# Patient Record
Sex: FEMALE | Race: WHITE | NOT HISPANIC OR LATINO | Employment: STUDENT | URBAN - METROPOLITAN AREA
[De-identification: names, ages, dates, MRNs, and addresses within clinical notes are randomized per-mention and may not be internally consistent; named-entity substitution may affect disease eponyms.]

---
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
## 2025-02-21 ENCOUNTER — OFFICE VISIT (OUTPATIENT)
Dept: PSYCHIATRY | Facility: CLINIC | Age: 20
End: 2025-02-21
Payer: MEDICAID

## 2025-02-21 VITALS
OXYGEN SATURATION: 98 % | HEART RATE: 69 BPM | SYSTOLIC BLOOD PRESSURE: 105 MMHG | BODY MASS INDEX: 38 KG/M2 | DIASTOLIC BLOOD PRESSURE: 75 MMHG | WEIGHT: 221.4 LBS

## 2025-02-21 DIAGNOSIS — F43.10 POST TRAUMATIC STRESS DISORDER (PTSD): Primary | ICD-10-CM

## 2025-02-21 DIAGNOSIS — F33.1 MDD (MAJOR DEPRESSIVE DISORDER), RECURRENT EPISODE, MODERATE: ICD-10-CM

## 2025-02-21 RX ORDER — LITHIUM CARBONATE 300 MG
300 TABLET ORAL NIGHTLY
Qty: 30 TABLET | Refills: 2 | Status: SHIPPED | OUTPATIENT
Start: 2025-02-21 | End: 2026-02-21
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
## 2025-02-27 NOTE — PATIENT INSTRUCTIONS
Exercises for Chronic Knee Pain  Chronic knee pain is pain that lasts longer than 3 months. For most people with chronic knee pain, exercise and weight loss is an important part of treatment. Your health care provider may want you to focus on:  · Strengthening the muscles that support your knee. This can take pressure off your knee and lessen pain.  · Preventing knee stiffness.  · Maintaining or increasing how far you can move your knee.  · Losing weight (if this applies) to take pressure off your knee, decrease your risk for injury, and make it easier for you to exercise.  Your health care provider will help you develop an exercise program that matches your needs and physical abilities. Below are simple, low-impact exercises you can do at home. Ask your health care provider or a physical therapist how often you should do your exercise program and how many times to repeat each exercise.  General safety tips  Follow these safety tips for exercising with chronic knee pain:  · Get your health care provider's approval before doing any exercises.  · Start slowly and stop any time an exercise causes pain.  · Do not exercise if your knee pain is flaring up.  · Warm up first. Stretching a cold muscle can cause an injury. Do 5-10 minutes of easy movement or light stretching before beginning your exercise routine.  · Do 5-10 minutes of low-impact activity (like walking or cycling) before starting strengthening exercises.  · Contact your health care provider any time you have pain during or after exercising. Exercise may cause discomfort but should not be painful. It is normal to be a little stiff or sore after exercising.    Stretching and range-of-motion exercises  Front thigh stretch    1. Stand up straight and support your body by holding on to a chair or resting one hand on a wall.  2. With your legs straight and close together, bend one knee to lift your heel up toward your buttocks.  3. Using one hand for support, grab  your ankle with your free hand.  4. Pull your foot up closer toward your buttocks to feel the stretch in front of your thigh.  5. Hold the stretch for 30 seconds.  Repeat __________ times. Complete this exercise __________ times a day.  Back thigh stretch    1. Sit on the floor with your back straight and your legs out straight in front of you.  2. Place the palms of your hands on the floor and slide them toward your feet as you bend at the hip.  3. Try to touch your nose to your knees and feel the stretch in the back of your thighs.  4. Hold for 30 seconds.  Repeat __________ times. Complete this exercise __________ times a day.  Calf stretch    1. Stand facing a wall.  2. Place the palms of your hands flat against the wall, arms extended, and lean slightly against the wall.  3. Get into a lunge position with one leg bent at the knee and the other leg stretched out straight behind you.  4. Keep both feet facing the wall and increase the bend in your knee while keeping the heel of the other leg flat on the ground.  5. You should feel the stretch in your calf. Hold for 30 seconds.  Repeat __________ times. Complete this exercise __________ times a day.  Strengthening exercises  Straight leg lift  1. Lie on your back with one knee bent and the other leg out straight.  2. Slowly lift the straight leg without bending the knee.  3. Lift until your foot is about 12 inches (30 cm) off the floor.  4. Hold for 3-5 seconds and slowly lower your leg.  Repeat __________ times. Complete this exercise __________ times a day.  Single leg dip  1. Stand between two chairs and put both hands on the backs of the chairs for support.  2. Extend one leg out straight with your body weight resting on the heel of the standing leg.  3. Slowly bend your standing knee to dip your body to the level that is comfortable for you.  4. Hold for 3-5 seconds.  Repeat __________ times. Complete this exercise __________ times a day.  Hamstring  curls  1. Stand straight, knees close together, facing the back of a chair.  2. Hold on to the back of a chair with both hands.  3. Keep one leg straight. Bend the other knee while bringing the heel up toward the buttock until the knee is bent at a 90-degree angle (right angle).  4. Hold for 3-5 seconds.  Repeat __________ times. Complete this exercise __________ times a day.  Wall squat  1. Stand straight with your back, hips, and head against a wall.  2. Step forward one foot at a time with your back still against the wall.  3. Your feet should be 2 feet (61 cm) from the wall at shoulder width. Keeping your back, hips, and head against the wall, slide down the wall to as close of a sitting position as you can get.  4. Hold for 5-10 seconds, then slowly slide back up.  Repeat __________ times. Complete this exercise __________ times a day.  Step-ups  1. Step up with one foot onto a sturdy platform or stool that is about 6 inches (15 cm) high.  2. Face sideways with one foot on the platform and one on the ground.  3. Place all your weight on the platform foot and lift your body off the ground until your knee extends.  4. Let your other leg hang free to the side.  5. Hold for 3-5 seconds then slowly lower your weight down to the floor foot.  Repeat __________ times. Complete this exercise __________ times a day.  Contact a health care provider if:  · Your exercise causes pain.  · Your pain is worse after you exercise.  · Your pain prevents you from doing your exercises.  This information is not intended to replace advice given to you by your health care provider. Make sure you discuss any questions you have with your health care provider.  Document Revised: 04/22/2021 Document Reviewed: 12/14/2020  [employer] Patient Education © 2021 [employer]     no

## 2025-03-31 ENCOUNTER — OFFICE VISIT (OUTPATIENT)
Dept: PSYCHIATRY | Facility: CLINIC | Age: 20
End: 2025-03-31
Payer: MEDICAID

## 2025-03-31 DIAGNOSIS — F33.1 MDD (MAJOR DEPRESSIVE DISORDER), RECURRENT EPISODE, MODERATE: Primary | ICD-10-CM

## 2025-03-31 DIAGNOSIS — F43.10 POST TRAUMATIC STRESS DISORDER (PTSD): ICD-10-CM
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
## 2025-05-20 ENCOUNTER — OFFICE VISIT (OUTPATIENT)
Dept: FAMILY MEDICINE CLINIC | Facility: CLINIC | Age: 20
End: 2025-05-20
Payer: COMMERCIAL

## 2025-05-20 VITALS
RESPIRATION RATE: 18 BRPM | BODY MASS INDEX: 38.31 KG/M2 | HEIGHT: 64 IN | TEMPERATURE: 98.7 F | SYSTOLIC BLOOD PRESSURE: 122 MMHG | OXYGEN SATURATION: 99 % | HEART RATE: 77 BPM | DIASTOLIC BLOOD PRESSURE: 84 MMHG | WEIGHT: 224.4 LBS

## 2025-05-20 DIAGNOSIS — E66.9 OBESITY (BMI 30-39.9): ICD-10-CM

## 2025-05-20 DIAGNOSIS — R19.00 RIGHT FLANK MASS: ICD-10-CM

## 2025-05-20 DIAGNOSIS — R10.9 ABDOMINAL PAIN, UNSPECIFIED ABDOMINAL LOCATION: ICD-10-CM

## 2025-05-20 DIAGNOSIS — R06.83 SNORING: Primary | ICD-10-CM

## 2025-05-20 DIAGNOSIS — M25.50 PAIN IN JOINT, MULTIPLE SITES: ICD-10-CM

## 2025-05-20 DIAGNOSIS — E55.9 VITAMIN D DEFICIENCY: ICD-10-CM

## 2025-05-20 DIAGNOSIS — R53.83 FATIGUE, UNSPECIFIED TYPE: ICD-10-CM

## 2025-05-20 DIAGNOSIS — R19.5 LOOSE STOOLS: ICD-10-CM

## 2025-05-20 NOTE — PROGRESS NOTES
Subjective     History of Present Illness  The patient presents for evaluation of a mass on the side of her body, joint pain, gastrointestinal issues, sleep disturbances, and weight management.    She reports a palpable mass on the lateral aspect of her body, which becomes noticeable upon stretching or movement. The mass is tender to touch and has not exhibited any changes in size. She regularly stretches and feels on her body to ensure she is okay, often rubbing it due to constant soreness and back pain.    She experiences generalized fatigue and joint pain, predominantly in her knees and ankles, with occasional wrist pain and hand cramps. The pain is not symmetrical. She has been diagnosed with scoliosis and arthritis of the spine and manages her joint and back pain with Advil and Tylenol.    Recently, she has been experiencing sharp, stabbing abdominal pains, a departure from her usual dull, achy discomfort. Her bowel movements have become infrequent and less substantial, occurring every other day or multiple times within a day. She has not had solid stools for approximately a month. Despite no changes in her diet, she has been eating less frequently due to a lack of appetite and stomach pain. She has consulted a gastroenterologist in the past, who performed an esophageal dilation and endoscopy, revealing only minor bowel irritation. This procedure was conducted when she was 17 years old. She has not undergone any food allergy or stool tests. She does not experience any adverse reactions to gluten but notes that spicy foods exacerbate her stomach issues. She has reduced her intake of spicy foods. She was advised to cease cannabis use, which she has since reduced from daily to occasional use. She expresses concern about her worsening condition and recurrent symptoms. She has undergone blood work in the past, which did not reveal any abnormalities. Previous attempts at medication management were unsuccessful,  leading to discontinuation.    She reports poor sleep quality, characterized by frequent awakenings and occasional morning headaches. She snores but has not undergone a sleep study. A sleep study was suggested during her last visit, but she did not receive any follow-up information.    She has been attempting weight loss but continues to gain weight despite improved dietary choices and increased physical activity. Her weight has remained stable at 220 pounds for the past year. She works on her feet all day and takes 10,000 steps a day. She was previously on Wellbutrin for mental health issues, which aided in weight loss, but she regained the weight after discontinuing the medication at age 18.    She has been diagnosed with PCOS. Her menstrual cycles are regular, occurring monthly, with occasional delays of 4 to 5 days. She is currently menstruating and used to experience severe cramps, which have since improved. She also reports heat intolerance, which previously triggered panic attacks, but now only causes agitation and dizziness.    GYNECOLOGICAL HISTORY:  - Frequency and Flow: Regular, monthly, with occasional delays of 4 to 5 days  - Menstrual Pain: Improved    PAST SURGICAL HISTORY:  - Esophageal dilation and endoscopy at age 17    SOCIAL HISTORY  The patient admits to smoking weed occasionally but has cut back significantly from smoking multiple times a day.    FAMILY HISTORY  Her uncle has Crohn's disease.  There is no family history of autoimmune diseases or thyroid issues that she is aware of.    Past Medical History:   Diagnosis Date    Allergic     Anxiety     Generalized anxiety d/o    Arthritis     Depression     Miscarriage 08/2022    PCOS (polycystic ovarian syndrome)     Scoliosis      Past Surgical History:   Procedure Laterality Date    DILATATION AND CURETTAGE      HAND SURGERY Right     WISDOM TOOTH EXTRACTION  10/2020     Family History   Problem Relation Age of Onset    Alcohol abuse Mother      Anxiety disorder Mother     Depression Mother     Vision loss Mother     Anxiety disorder Father     Depression Father     Diabetes Father     Drug abuse Father     Heart disease Father     Hypertension Father     Vision loss Father     Anxiety disorder Sister     Depression Sister     Vision loss Sister     Alcohol abuse Sister     Anxiety disorder Brother     Depression Brother     Alcohol abuse Maternal Aunt     Vision loss Maternal Aunt     Vision loss Maternal Uncle     Alcohol abuse Paternal Uncle     Alcohol abuse Maternal Grandmother     Arthritis Maternal Grandmother     Vision loss Maternal Grandmother     Vision loss Maternal Grandfather     Vision loss Paternal Grandmother     Vision loss Paternal Grandfather      Social History     Socioeconomic History    Marital status: Significant Other    Number of children: 0    Highest education level: Some college, no degree   Tobacco Use    Smoking status: Every Day     Types: Electronic Cigarette     Passive exposure: Current    Smokeless tobacco: Never   Vaping Use    Vaping status: Every Day    Substances: Nicotine, Flavoring    Devices: Disposable   Substance and Sexual Activity    Alcohol use: Not Currently     Comment: ocasionally    Drug use: Yes     Types: Marijuana    Sexual activity: Yes     Partners: Male     Birth control/protection: None       No current outpatient medications on file.    Review of Systems   Constitutional:  Positive for fatigue. Negative for activity change, appetite change, chills, diaphoresis, fever, unexpected weight gain and unexpected weight loss.   HENT:  Negative for trouble swallowing.         Snoring   Eyes:  Negative for blurred vision and visual disturbance.   Respiratory:  Negative for cough, chest tightness, shortness of breath and wheezing.    Cardiovascular:  Negative for chest pain, palpitations and leg swelling.   Gastrointestinal:  Positive for abdominal pain. Negative for nausea, vomiting, GERD and  "indigestion.        Loose bowel movements   Genitourinary:  Negative for menstrual problem.   Musculoskeletal:  Positive for back pain. Negative for gait problem.   Neurological:  Negative for dizziness, tremors, syncope, weakness, light-headedness, headache and confusion.   Psychiatric/Behavioral:  Positive for sleep disturbance. Negative for depressed mood and stress. The patient is not nervous/anxious.      /84 (BP Location: Left arm, Patient Position: Sitting, Cuff Size: Adult)   Pulse 77   Temp 98.7 °F (37.1 °C) (Temporal)   Resp 18   Ht 162.6 cm (64.02\")   Wt 102 kg (224 lb 6.4 oz)   SpO2 99%   BMI 38.50 kg/m²       Objective   Physical Exam  Vitals and nursing note reviewed.   Constitutional:       General: She is not in acute distress.     Appearance: Normal appearance. She is normal weight.   HENT:      Head: Normocephalic and atraumatic.   Neck:      Thyroid: No thyroid mass, thyromegaly or thyroid tenderness.   Cardiovascular:      Rate and Rhythm: Normal rate and regular rhythm.      Heart sounds: Normal heart sounds. No murmur heard.  Pulmonary:      Effort: Pulmonary effort is normal. No respiratory distress.      Breath sounds: Normal breath sounds. No wheezing, rhonchi or rales.   Abdominal:      General: Abdomen is flat. Bowel sounds are normal. There is no distension.      Palpations: Abdomen is soft. There is no mass.      Tenderness: There is abdominal tenderness in the right upper quadrant, suprapubic area and left upper quadrant.   Musculoskeletal:      Cervical back: Normal range of motion and neck supple.        Back:       Right lower leg: No edema.      Left lower leg: No edema.   Skin:     General: Skin is warm and dry.   Neurological:      General: No focal deficit present.      Mental Status: She is alert and oriented to person, place, and time.   Psychiatric:         Mood and Affect: Mood normal.         Behavior: Behavior normal.         Thought Content: Thought content " normal.         Physical Exam  Gastrointestinal: Tenderness noted in the abdomen, particularly on the left side.  Other: Palpable mass on the left side of the body, tender upon movement.    Procedures     Results      Assessment    Diagnoses and all orders for this visit:    1. Snoring (Primary)  Comments:  sleep study referral entered.  Orders:  -     Ambulatory Referral to Sleep Medicine    2. Obesity (BMI 30-39.9)  Comments:  Will check labs.  Orders:  -     Ambulatory Referral to Sleep Medicine  -     Insulin, Total; Future  -     Hemoglobin A1c; Future  -     T3, free; Future  -     T4, free; Future  -     Thyroid Antibodies; Future  -     Cortisol - AM; Future    3. Pain in joint, multiple sites  Comments:  Will check labs.  Orders:  -     ABDIFATAH Comprehensive Panel; Future  -     Rheumatoid Factor, Quant; Future  -     Sedimentation rate, automated; Future  -     C-reactive protein; Future    4. Abdominal pain, unspecified abdominal location  Comments:  chronic issues.  Will check labs.  Orders:  -     CBC w AUTO Differential; Future  -     Food Allergy Profile; Future    5. Fatigue, unspecified type  Comments:  will check labs.  Orders:  -     ABDIFATAH Comprehensive Panel; Future  -     TSH; Future  -     T3, free; Future  -     T4, free; Future  -     Thyroid Antibodies; Future  -     Cortisol - AM; Future  -     Lyme Disease Total Antibody With Reflex to Immunoassay; Future    6. Vitamin D deficiency  Comments:  Will check labs.  Orders:  -     Vitamin D 25 hydroxy; Future    7. Loose stools  Comments:  Will check labs and stool studies.  Has had colonoscopy, upper endoscopy, CT scan in past which didn't show anything concerning.  Consider HIDA scan.  Orders:  -     Celiac Comprehensive Panel; Future  -     Inflammatory Bowel Disease Panel; Future  -     Gastrointestinal Panel, PCR - Stool, Per Rectum; Future    8. Right flank mass  Comments:  will check soft tissue ultrasound and urine.  Orders:  -     US Soft  Tissue; Future  -     Urinalysis With Culture If Indicated - Urine, Clean Catch; Future         Assessment & Plan  1. Unspecified mass.  - Patient reports a mass on the side of the body, tender upon movement.  - Physical examination reveals tenderness in the area of the mass.  - Ultrasound of the mass will be scheduled to determine its nature.  - Patient advised to monitor for any changes in the mass.    2. Joint pain.  - Patient experiences joint pain in ankles, wrists, fingers, and knees, with varying intensity.  - No symmetrical pattern observed in joint pain.  - Fasting blood work will be ordered to assess insulin and cortisol levels.  - Patient advised to continue using Advil and Tylenol for pain management.    3. Gastrointestinal issues.  - Patient reports sharp, stabbing abdominal pains and infrequent, non-solid bowel movements for the past 2-3 months.  - Physical examination reveals tenderness in the abdominal area.  - Stool test will be conducted to further investigate the cause of the symptoms.  - Patient advised to consider dietary modifications and monitor for any food sensitivities.    4. Sleep disturbances.  - Patient reports frequent awakenings during the night and poor sleep quality.  - Referral for a sleep study will be made to evaluate the sleep disturbances.  - Patient advised to follow up if no contact is made regarding the sleep study within a couple of weeks.              Patient or patient representative verbalized consent for the use of Ambient Listening during the visit with  ALEXANDER Mcdermott for chart documentation. 5/20/2025  16:46 EDT

## 2025-05-29 ENCOUNTER — LAB (OUTPATIENT)
Dept: LAB | Facility: HOSPITAL | Age: 20
End: 2025-05-29
Payer: COMMERCIAL

## 2025-05-29 DIAGNOSIS — E55.9 VITAMIN D DEFICIENCY: ICD-10-CM

## 2025-05-29 DIAGNOSIS — E66.9 OBESITY (BMI 30-39.9): ICD-10-CM

## 2025-05-29 DIAGNOSIS — R19.5 LOOSE STOOLS: ICD-10-CM

## 2025-05-29 DIAGNOSIS — R19.00 RIGHT FLANK MASS: ICD-10-CM

## 2025-05-29 DIAGNOSIS — R10.9 ABDOMINAL PAIN, UNSPECIFIED ABDOMINAL LOCATION: ICD-10-CM

## 2025-05-29 DIAGNOSIS — R53.83 FATIGUE, UNSPECIFIED TYPE: ICD-10-CM

## 2025-05-29 DIAGNOSIS — M25.50 PAIN IN JOINT, MULTIPLE SITES: ICD-10-CM

## 2025-05-29 LAB
25(OH)D3 SERPL-MCNC: 31.8 NG/ML (ref 30–100)
BASOPHILS # BLD AUTO: 0.04 10*3/MM3 (ref 0–0.2)
BASOPHILS NFR BLD AUTO: 0.6 % (ref 0–1.5)
BILIRUB UR QL STRIP: NEGATIVE
CHROMATIN AB SERPL-ACNC: <10 IU/ML (ref 0–14)
CLARITY UR: ABNORMAL
COLOR UR: YELLOW
CORTIS SERPL-MCNC: 7.28 MCG/DL
CRP SERPL-MCNC: <0.3 MG/DL (ref 0–0.5)
DEPRECATED RDW RBC AUTO: 39.2 FL (ref 37–54)
EOSINOPHIL # BLD AUTO: 0.3 10*3/MM3 (ref 0–0.4)
EOSINOPHIL NFR BLD AUTO: 4.5 % (ref 0.3–6.2)
ERYTHROCYTE [DISTWIDTH] IN BLOOD BY AUTOMATED COUNT: 11.8 % (ref 12.3–15.4)
ERYTHROCYTE [SEDIMENTATION RATE] IN BLOOD: 2 MM/HR (ref 0–20)
GLUCOSE UR STRIP-MCNC: NEGATIVE MG/DL
HBA1C MFR BLD: 4.9 % (ref 4.8–5.6)
HCT VFR BLD AUTO: 43.2 % (ref 34–46.6)
HGB BLD-MCNC: 14.4 G/DL (ref 12–15.9)
HGB UR QL STRIP.AUTO: NEGATIVE
HOLD SPECIMEN: NORMAL
IMM GRANULOCYTES # BLD AUTO: 0.02 10*3/MM3 (ref 0–0.05)
IMM GRANULOCYTES NFR BLD AUTO: 0.3 % (ref 0–0.5)
INSULIN SERPL-ACNC: 19.2 UU/ML (ref 2–23)
KETONES UR QL STRIP: NEGATIVE
LEUKOCYTE ESTERASE UR QL STRIP.AUTO: NEGATIVE
LYMPHOCYTES # BLD AUTO: 1.63 10*3/MM3 (ref 0.7–3.1)
LYMPHOCYTES NFR BLD AUTO: 24.5 % (ref 19.6–45.3)
MCH RBC QN AUTO: 30 PG (ref 26.6–33)
MCHC RBC AUTO-ENTMCNC: 33.3 G/DL (ref 31.5–35.7)
MCV RBC AUTO: 90 FL (ref 79–97)
MONOCYTES # BLD AUTO: 0.42 10*3/MM3 (ref 0.1–0.9)
MONOCYTES NFR BLD AUTO: 6.3 % (ref 5–12)
NEUTROPHILS NFR BLD AUTO: 4.24 10*3/MM3 (ref 1.7–7)
NEUTROPHILS NFR BLD AUTO: 63.8 % (ref 42.7–76)
NITRITE UR QL STRIP: NEGATIVE
NRBC BLD AUTO-RTO: 0 /100 WBC (ref 0–0.2)
PH UR STRIP.AUTO: 8 [PH] (ref 5–8)
PLATELET # BLD AUTO: 291 10*3/MM3 (ref 140–450)
PMV BLD AUTO: 10.8 FL (ref 6–12)
PROT UR QL STRIP: NEGATIVE
RBC # BLD AUTO: 4.8 10*6/MM3 (ref 3.77–5.28)
SP GR UR STRIP: 1.02 (ref 1–1.03)
T3FREE SERPL-MCNC: 3.22 PG/ML (ref 2–4.4)
T4 FREE SERPL-MCNC: 1.22 NG/DL (ref 0.92–1.68)
TSH SERPL DL<=0.05 MIU/L-ACNC: 1.43 UIU/ML (ref 0.27–4.2)
UROBILINOGEN UR QL STRIP: ABNORMAL
WBC NRBC COR # BLD AUTO: 6.65 10*3/MM3 (ref 3.4–10.8)

## 2025-05-29 PROCEDURE — 86376 MICROSOMAL ANTIBODY EACH: CPT

## 2025-05-29 PROCEDURE — 86225 DNA ANTIBODY NATIVE: CPT

## 2025-05-29 PROCEDURE — 86003 ALLG SPEC IGE CRUDE XTRC EA: CPT

## 2025-05-29 PROCEDURE — 86235 NUCLEAR ANTIGEN ANTIBODY: CPT

## 2025-05-29 PROCEDURE — 86231 EMA EACH IG CLASS: CPT

## 2025-05-29 PROCEDURE — 82784 ASSAY IGA/IGD/IGG/IGM EACH: CPT

## 2025-05-29 PROCEDURE — 86140 C-REACTIVE PROTEIN: CPT

## 2025-05-29 PROCEDURE — 86258 DGP ANTIBODY EACH IG CLASS: CPT

## 2025-05-29 PROCEDURE — 86618 LYME DISEASE ANTIBODY: CPT

## 2025-05-29 PROCEDURE — 86431 RHEUMATOID FACTOR QUANT: CPT

## 2025-05-29 PROCEDURE — 85025 COMPLETE CBC W/AUTO DIFF WBC: CPT

## 2025-05-29 PROCEDURE — 86671 FUNGUS NES ANTIBODY: CPT

## 2025-05-29 PROCEDURE — 85652 RBC SED RATE AUTOMATED: CPT

## 2025-05-29 PROCEDURE — 82306 VITAMIN D 25 HYDROXY: CPT

## 2025-05-29 PROCEDURE — 82533 TOTAL CORTISOL: CPT

## 2025-05-29 PROCEDURE — 36415 COLL VENOUS BLD VENIPUNCTURE: CPT

## 2025-05-29 PROCEDURE — 86364 TISS TRNSGLTMNASE EA IG CLAS: CPT

## 2025-05-29 PROCEDURE — 86800 THYROGLOBULIN ANTIBODY: CPT

## 2025-05-29 PROCEDURE — 86037 ANCA TITER EACH ANTIBODY: CPT

## 2025-05-29 PROCEDURE — 84481 FREE ASSAY (FT-3): CPT

## 2025-05-29 PROCEDURE — 83525 ASSAY OF INSULIN: CPT

## 2025-05-29 PROCEDURE — 84439 ASSAY OF FREE THYROXINE: CPT

## 2025-05-29 PROCEDURE — 83036 HEMOGLOBIN GLYCOSYLATED A1C: CPT

## 2025-05-29 PROCEDURE — 81003 URINALYSIS AUTO W/O SCOPE: CPT

## 2025-05-29 PROCEDURE — 84443 ASSAY THYROID STIM HORMONE: CPT

## 2025-05-30 LAB
B BURGDOR IGG+IGM SER QL IA: NEGATIVE
CENTROMERE B AB SER-ACNC: <0.2 AI (ref 0–0.9)
CHROMATIN AB SERPL-ACNC: <0.2 AI (ref 0–0.9)
DSDNA AB SER-ACNC: <1 IU/ML (ref 0–9)
ENA JO1 AB SER-ACNC: <0.2 AI (ref 0–0.9)
ENA RNP AB SER-ACNC: 0.2 AI (ref 0–0.9)
ENA SCL70 AB SER-ACNC: <0.2 AI (ref 0–0.9)
ENA SM AB SER-ACNC: <0.2 AI (ref 0–0.9)
ENA SS-A AB SER-ACNC: <0.2 AI (ref 0–0.9)
ENA SS-B AB SER-ACNC: <0.2 AI (ref 0–0.9)
ENDOMYSIUM IGA SER QL: NEGATIVE
GLIADIN PEPTIDE IGA SER-ACNC: 4 UNITS (ref 0–19)
GLIADIN PEPTIDE IGG SER-ACNC: 2 UNITS (ref 0–19)
IGA SERPL-MCNC: 194 MG/DL (ref 87–352)
Lab: NORMAL
THYROGLOB AB SERPL-ACNC: <1 IU/ML (ref 0–0.9)
THYROPEROXIDASE AB SERPL-ACNC: 11 IU/ML (ref 0–34)
TTG IGA SER-ACNC: <2 U/ML (ref 0–3)
TTG IGG SER-ACNC: <2 U/ML (ref 0–5)

## 2025-06-02 LAB
BAKER'S YEAST IGA QN: <20 UNITS (ref 0–24.9)
BAKER'S YEAST IGG QN: <20 UNITS (ref 0–24.9)
CLAM IGE QN: <0.1 KU/L
CODFISH IGE QN: <0.1 KU/L
CONV CLASS DESCRIPTION: ABNORMAL
CORN IGE QN: 0.18 KU/L
COW MILK IGE QN: 0.22 KU/L
EGG WHITE IGE QN: <0.1 KU/L
P-ANCA ATYPICAL TITR SER IF: NORMAL TITER
PEANUT IGE QN: 1.43 KU/L
SCALLOP IGE QN: 0.45 KU/L
SESAME SEED IGE QN: 0.14 KU/L
SHRIMP IGE QN: <0.1 KU/L
SOYBEAN IGE QN: <0.1 KU/L
WALNUT IGE QN: 0.16 KU/L
WHEAT IGE QN: <0.1 KU/L

## 2025-06-04 ENCOUNTER — HOSPITAL ENCOUNTER (OUTPATIENT)
Dept: ULTRASOUND IMAGING | Facility: HOSPITAL | Age: 20
Discharge: HOME | End: 2025-06-04
Admitting: PHYSICIAN ASSISTANT
Payer: COMMERCIAL

## 2025-06-04 DIAGNOSIS — R19.00 RIGHT FLANK MASS: ICD-10-CM

## 2025-06-04 PROCEDURE — 76705 ECHO EXAM OF ABDOMEN: CPT

## 2025-07-01 ENCOUNTER — APPOINTMENT (OUTPATIENT)
Dept: GENERAL RADIOLOGY | Facility: HOSPITAL | Age: 20
End: 2025-07-01
Payer: COMMERCIAL

## 2025-07-01 ENCOUNTER — HOSPITAL ENCOUNTER (EMERGENCY)
Facility: HOSPITAL | Age: 20
Discharge: HOME | End: 2025-07-01
Admitting: EMERGENCY MEDICINE
Payer: COMMERCIAL

## 2025-07-01 VITALS
HEART RATE: 103 BPM | OXYGEN SATURATION: 97 % | DIASTOLIC BLOOD PRESSURE: 85 MMHG | WEIGHT: 215 LBS | BODY MASS INDEX: 36.7 KG/M2 | SYSTOLIC BLOOD PRESSURE: 132 MMHG | RESPIRATION RATE: 16 BRPM | HEIGHT: 64 IN | TEMPERATURE: 98.8 F

## 2025-07-01 DIAGNOSIS — M25.571 ACUTE RIGHT ANKLE PAIN: Primary | ICD-10-CM

## 2025-07-01 DIAGNOSIS — S99.911A INJURY OF RIGHT ANKLE, INITIAL ENCOUNTER: ICD-10-CM

## 2025-07-01 PROCEDURE — 73610 X-RAY EXAM OF ANKLE: CPT

## 2025-07-01 PROCEDURE — 99283 EMERGENCY DEPT VISIT LOW MDM: CPT

## 2025-07-01 RX ORDER — HYDROCODONE BITARTRATE AND ACETAMINOPHEN 5; 325 MG/1; MG/1
1 TABLET ORAL EVERY 6 HOURS PRN
Qty: 12 TABLET | Refills: 0 | Status: SHIPPED | OUTPATIENT
Start: 2025-07-01

## 2025-07-01 RX ORDER — HYDROCODONE BITARTRATE AND ACETAMINOPHEN 5; 325 MG/1; MG/1
1 TABLET ORAL ONCE AS NEEDED
Refills: 0 | Status: COMPLETED | OUTPATIENT
Start: 2025-07-01 | End: 2025-07-01

## 2025-07-01 RX ADMIN — HYDROCODONE BITARTRATE AND ACETAMINOPHEN 1 TABLET: 5; 325 TABLET ORAL at 22:15

## 2025-07-01 NOTE — Clinical Note
[facility] EMERGENCY DEPARTMENT  [address] [place] [address]  Phone: [phone]    Yadi Schneider was seen and treated in our emergency department on 7/1/2025.  She may return to work on 07/03/2025.         Thank you for choosing [facility].    Magdalena Palomino RN

## 2025-07-02 NOTE — ED PROVIDER NOTES
Subjective   Chief Complaint   Patient presents with    Ankle Injury     History of Present Illness  History provided by patient  Patient is a 21 female presents the ED with an injury to her right ankle.  She reports that she tripped, inverted her ankle and has pain in the lateral aspect of the ankle.  Denies foot pain.  Denies other injury.  She reports its painful to bear weight      Review of Systems    Past Medical History:   Diagnosis Date    Allergic     Anxiety     Generalized anxiety d/o    Arthritis     Depression     Miscarriage 08/2022    PCOS (polycystic ovarian syndrome)     Scoliosis        No Known Allergies    Past Surgical History:   Procedure Laterality Date    DILATATION AND CURETTAGE      HAND SURGERY Right     WISDOM TOOTH EXTRACTION  10/2020       Family History   Problem Relation Age of Onset    Alcohol abuse Mother     Anxiety disorder Mother     Depression Mother     Vision loss Mother     Anxiety disorder Father     Depression Father     Diabetes Father     Drug abuse Father     Heart disease Father     Hypertension Father     Vision loss Father     Anxiety disorder Sister     Depression Sister     Vision loss Sister     Alcohol abuse Sister     Anxiety disorder Brother     Depression Brother     Alcohol abuse Maternal Aunt     Vision loss Maternal Aunt     Vision loss Maternal Uncle     Alcohol abuse Paternal Uncle     Alcohol abuse Maternal Grandmother     Arthritis Maternal Grandmother     Vision loss Maternal Grandmother     Vision loss Maternal Grandfather     Vision loss Paternal Grandmother     Vision loss Paternal Grandfather        Social History     Socioeconomic History    Marital status: Single    Number of children: 0    Highest education level: Some college, no degree   Tobacco Use    Smoking status: Every Day     Types: Electronic Cigarette     Passive exposure: Current    Smokeless tobacco: Never   Vaping Use    Vaping status: Every Day    Substances: Nicotine, Flavoring     Devices: Disposable   Substance and Sexual Activity    Alcohol use: Not Currently     Comment: ocasionally    Drug use: Yes     Types: Marijuana    Sexual activity: Yes     Partners: Male     Birth control/protection: None           Objective   Physical Exam  Vitals and nursing note reviewed.   Constitutional:       Appearance: Normal appearance.   HENT:      Head: Normocephalic and atraumatic.   Cardiovascular:      Heart sounds: Normal heart sounds. No murmur heard.     No friction rub. No gallop.   Musculoskeletal:      Right ankle: Tenderness present.      Comments: Ecchymosis, tenderness, swelling of the right lateral ankle.  No tenderness noted to the foot.  DP pulse intact.  Cap refill brisk.  Sensation intact to light touch.   Skin:     General: Skin is warm and dry.      Capillary Refill: Capillary refill takes less than 2 seconds.      Findings: No erythema or rash.   Neurological:      General: No focal deficit present.      Mental Status: She is alert and oriented to person, place, and time.         Procedures           ED Course        XR Ankle 3+ View Right  Result Date: 7/1/2025  Impression: No evidence of fracture Electronically Signed: Raj Singh  7/1/2025 9:48 PM EDT  Workstation ID: [id number]                                                   Medical Decision Making  Problems Addressed:  Acute right ankle pain: complicated acute illness or injury  Injury of right ankle, initial encounter: complicated acute illness or injury    Amount and/or Complexity of Data Reviewed  Radiology: ordered.    Risk  Prescription drug management.    Patient INSPECT report queried and reviewed.  I discussed with the patient the risk and benefits associated with opiate/narcotic pain medication today.  Based on patient's exam findings and assessment, I do feel it appropriate to prescribe a short course of opiate/ narcotic pain medication from the emergency department.  The patient was advised of the risks associated  with such medication, including risk of dependency.  Patient was advised of medication administration instructions, appropriate use, potential side effects and adverse reactions.  Acknowledged and verbalized understanding, and agrees to treatment.  Appropriate PPE worn during patient interactions.  Differential diagnoses considered for patient chief complaint and presentation, this list is not all inclusive of diagnoses considered: Sprain, fracture, contusion.  Patient Emge interpreted per ED attending physician, Dr. Otto, viewed by me reveals no fracture or dislocation.  She does have swelling noted lateral ankle.  Will place Aircast, crutches, advised to be nonweightbearing and follow-up with Ortho for repeat exam.  Considertion was given for admission, however patient has reassuring exam and workup in the ed and appears appropriate for discharge home and continued outpatient care.     We discussed my findings, plan of care, discharge instructions, the importance of follow up with their PCP/ and or specialist for repeat evaluation and to discuss any abnormal findings in labs or imaging that warrant further outpatient evaluation. We discussed that although a definitive diagnosis is not always found in the ED, it is believed emergent conditions have been ruled out, and patient is safe for discharge at this time.  We discussed return precautions for the emergency department.  Patient verbalizes understandings, and agrees with current plan of care.      Note Disclaimer: At [facility], we believe that sharing information builds trust and better relationships. You are receiving this note because you recently visited [facility]. It is possible you will see health information before a provider has talked with you about it. This kind of information can be easy to misunderstand. To help you fully understand what it means for your health, we urge you to discuss this note with your provider  Note dictated utilizing  [employer].          Final diagnoses:   Acute right ankle pain   Injury of right ankle, initial encounter       ED Disposition  ED Disposition       ED Disposition   Discharge    Condition   Stable    Comment   --               Bartolo Pagan II, MD  [address] [place] [address] [place] [address] [phone]    Schedule an appointment as soon as possible for a visit            Medication List        New Prescriptions      HYDROcodone-acetaminophen 5-325 MG per tablet  Commonly known as: NORCO  Take 1 tablet by mouth Every 6 (Six) Hours As Needed for Moderate Pain.               Where to Get Your Medications        These medications were sent to [facility]/pharmacy #[id number] - [place], [place] - [address] [place] [address] - [phone]  - [phone] [address] [place] [address]      Hours: 24-hours Phone: [phone]   HYDROcodone-acetaminophen 5-325 MG per tablet            Sultana Dutta, PRABHU  07/01/25 3455

## 2025-07-02 NOTE — DISCHARGE INSTRUCTIONS
Please follow-up with Ortho as above for recheck, no weightbearing until seen by Ortho or your primary care  Return to ED for new or worsening symptoms